# Patient Record
Sex: FEMALE | Race: WHITE | NOT HISPANIC OR LATINO
[De-identification: names, ages, dates, MRNs, and addresses within clinical notes are randomized per-mention and may not be internally consistent; named-entity substitution may affect disease eponyms.]

---

## 2017-08-15 ENCOUNTER — APPOINTMENT (OUTPATIENT)
Dept: COLORECTAL SURGERY | Facility: CLINIC | Age: 37
End: 2017-08-15
Payer: COMMERCIAL

## 2017-08-15 VITALS
RESPIRATION RATE: 12 BRPM | HEIGHT: 62 IN | HEART RATE: 80 BPM | BODY MASS INDEX: 27.6 KG/M2 | SYSTOLIC BLOOD PRESSURE: 126 MMHG | WEIGHT: 150 LBS | TEMPERATURE: 98.2 F | DIASTOLIC BLOOD PRESSURE: 80 MMHG

## 2017-08-15 PROCEDURE — 46940 TREATMENT OF ANAL FISSURE: CPT

## 2017-08-15 PROCEDURE — 99214 OFFICE O/P EST MOD 30 MIN: CPT | Mod: 25

## 2017-09-26 ENCOUNTER — APPOINTMENT (OUTPATIENT)
Dept: COLORECTAL SURGERY | Facility: CLINIC | Age: 37
End: 2017-09-26
Payer: COMMERCIAL

## 2017-09-26 VITALS
RESPIRATION RATE: 12 BRPM | TEMPERATURE: 98 F | DIASTOLIC BLOOD PRESSURE: 78 MMHG | HEIGHT: 62 IN | WEIGHT: 150 LBS | BODY MASS INDEX: 27.6 KG/M2 | HEART RATE: 78 BPM | SYSTOLIC BLOOD PRESSURE: 120 MMHG

## 2017-09-26 PROCEDURE — 99214 OFFICE O/P EST MOD 30 MIN: CPT

## 2017-09-26 RX ORDER — NITROGLYCERIN 4 MG/G
0.4 OINTMENT RECTAL
Qty: 1 | Refills: 1 | Status: ACTIVE | COMMUNITY
Start: 2017-08-15 | End: 1900-01-01

## 2018-01-31 ENCOUNTER — APPOINTMENT (OUTPATIENT)
Dept: COLORECTAL SURGERY | Facility: CLINIC | Age: 38
End: 2018-01-31
Payer: COMMERCIAL

## 2018-01-31 VITALS — HEIGHT: 62 IN | RESPIRATION RATE: 14 BRPM | WEIGHT: 150 LBS | BODY MASS INDEX: 27.6 KG/M2

## 2018-01-31 PROCEDURE — 46942 TREATMENT OF ANAL FISSURE: CPT

## 2018-01-31 PROCEDURE — 99214 OFFICE O/P EST MOD 30 MIN: CPT | Mod: 25

## 2018-04-17 ENCOUNTER — APPOINTMENT (OUTPATIENT)
Dept: COLORECTAL SURGERY | Facility: CLINIC | Age: 38
End: 2018-04-17
Payer: COMMERCIAL

## 2018-04-17 VITALS — WEIGHT: 135 LBS | HEIGHT: 62 IN | BODY MASS INDEX: 24.84 KG/M2 | RESPIRATION RATE: 14 BRPM

## 2018-04-17 PROCEDURE — 99214 OFFICE O/P EST MOD 30 MIN: CPT

## 2018-10-16 ENCOUNTER — APPOINTMENT (OUTPATIENT)
Dept: COLORECTAL SURGERY | Facility: CLINIC | Age: 38
End: 2018-10-16
Payer: COMMERCIAL

## 2018-10-16 VITALS
RESPIRATION RATE: 14 BRPM | HEIGHT: 62 IN | HEART RATE: 85 BPM | BODY MASS INDEX: 25.76 KG/M2 | DIASTOLIC BLOOD PRESSURE: 78 MMHG | WEIGHT: 140 LBS | SYSTOLIC BLOOD PRESSURE: 128 MMHG

## 2018-10-16 DIAGNOSIS — K64.8 OTHER HEMORRHOIDS: ICD-10-CM

## 2018-10-16 PROCEDURE — 46942 TREATMENT OF ANAL FISSURE: CPT

## 2018-10-16 PROCEDURE — 99214 OFFICE O/P EST MOD 30 MIN: CPT | Mod: 25

## 2018-12-14 ENCOUNTER — APPOINTMENT (OUTPATIENT)
Dept: COLORECTAL SURGERY | Facility: CLINIC | Age: 38
End: 2018-12-14

## 2019-04-10 ENCOUNTER — TRANSCRIPTION ENCOUNTER (OUTPATIENT)
Age: 39
End: 2019-04-10

## 2019-10-19 ENCOUNTER — TRANSCRIPTION ENCOUNTER (OUTPATIENT)
Age: 39
End: 2019-10-19

## 2021-07-06 ENCOUNTER — APPOINTMENT (OUTPATIENT)
Dept: COLORECTAL SURGERY | Facility: CLINIC | Age: 41
End: 2021-07-06
Payer: COMMERCIAL

## 2021-07-06 VITALS
DIASTOLIC BLOOD PRESSURE: 79 MMHG | HEIGHT: 62 IN | TEMPERATURE: 97.7 F | HEART RATE: 85 BPM | SYSTOLIC BLOOD PRESSURE: 123 MMHG | BODY MASS INDEX: 25.76 KG/M2 | WEIGHT: 140 LBS | RESPIRATION RATE: 14 BRPM

## 2021-07-06 DIAGNOSIS — K62.9 DISEASE OF ANUS AND RECTUM, UNSPECIFIED: ICD-10-CM

## 2021-07-06 DIAGNOSIS — K60.2 ANAL FISSURE, UNSPECIFIED: ICD-10-CM

## 2021-07-06 PROCEDURE — 46924 DESTRUCTION ANAL LESION(S): CPT

## 2021-07-06 PROCEDURE — 99072 ADDL SUPL MATRL&STAF TM PHE: CPT

## 2021-07-06 NOTE — ASSESSMENT
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

## 2021-07-06 NOTE — PHYSICAL EXAM
[Abdomen Masses] : No abdominal masses [Abdomen Tenderness] : ~T No ~M abdominal tenderness [Tender] : nontender [Normal rectal exam] : exam was normal [Excoriation] : no perianal excoriation [Tender, Swollen] : tender, swollen [Tight] : was tight [Posterior] : posteriorly [None] : there was no rectal mass  [JVD] : no jugular venous distention  [Normal Breath Sounds] : Normal breath sounds [Normal Heart Sounds] : normal heart sounds [Normal Rate and Rhythm] : normal rate and rhythm [No Rash or Lesion] : No rash or lesion [Alert] : alert [Oriented to Person] : oriented to person [Oriented to Place] : oriented to place [Oriented to Time] : oriented to time [Calm] : calm [de-identified] : Half a centimeter growth in the posterior anal region [de-identified] : Looks well in no distress, of stated age. [de-identified] : pupils equal reactive to light normocephalic atraumatic. [de-identified] : moves all 4 extremities appropriately with 5 over 5 strength

## 2021-07-06 NOTE — HISTORY OF PRESENT ILLNESS
[FreeTextEntry1] : 41 year-old female with chronic anal fissure. Occasional rectal bleeding, so has a new growth in the posterior perianal region which causes pain and bleeding

## 2021-07-06 NOTE — PROCEDURE
[FreeTextEntry1] : After informed consent was obtained removal of perianal lesion was excise sharply without any complications

## 2021-07-07 ENCOUNTER — LABORATORY RESULT (OUTPATIENT)
Age: 41
End: 2021-07-07

## 2021-07-20 ENCOUNTER — TRANSCRIPTION ENCOUNTER (OUTPATIENT)
Age: 41
End: 2021-07-20

## 2022-04-18 ENCOUNTER — TRANSCRIPTION ENCOUNTER (OUTPATIENT)
Age: 42
End: 2022-04-18

## 2022-12-15 ENCOUNTER — NON-APPOINTMENT (OUTPATIENT)
Age: 42
End: 2022-12-15

## 2022-12-22 ENCOUNTER — APPOINTMENT (OUTPATIENT)
Dept: ORTHOPEDIC SURGERY | Facility: CLINIC | Age: 42
End: 2022-12-22

## 2022-12-22 PROCEDURE — 20610 DRAIN/INJ JOINT/BURSA W/O US: CPT | Mod: RT

## 2022-12-22 PROCEDURE — 73502 X-RAY EXAM HIP UNI 2-3 VIEWS: CPT

## 2022-12-22 PROCEDURE — 99203 OFFICE O/P NEW LOW 30 MIN: CPT | Mod: 25

## 2022-12-22 PROCEDURE — J3490M: CUSTOM

## 2022-12-22 NOTE — ASSESSMENT
[FreeTextEntry1] : 42F with right hip troch bursitis\par \par Discussed conservative treatment of anti-inflammatories, CSI, and PT/HEP. May consider MRI in the future. She would like to proceed with troch bursa CSI today, tolerated well. Mobic and PT rx. f/up 6-8 weeks if symptoms fail to improve or worsen.

## 2022-12-22 NOTE — IMAGING
[Right] : right hip with pelvis [There are no fractures, subluxations or dislocations. No significant abnormalities are seen] : There are no fractures, subluxations or dislocations. No significant abnormalities are seen

## 2022-12-22 NOTE — DISCUSSION/SUMMARY
[de-identified] : The patient was advised of the diagnosis.  The natural history of the pathology was explained in full to the patient in layman's terms. All questions were answered.  The risks and benefits of surgical and non-surgical treatment alternatives were explained in full to the patient.\par \par The risks, benefits, contents and alternatives to injection were explained in full to the patient.  Risks outlined include but are not limited to infection, sepsis, bleeding, scarring, skin discoloration, temporary increase in pain, syncopal episode, failure to resolve symptoms, allergic reaction, flare reaction, permanent white skin discoloration, symptom recurrence, and elevation of blood sugar in diabetics.  Patient understood the risks.  All questions were answered.  After discussion of options, patient requested an injection.  Oral informed consent was obtained and sterile prep was done of the injection site.  Sterile technique was used to introduce the mixture.  Patient tolerated the procedure well.  Patient advised to ice the injection site this evening.  Signs and symptoms of infection reviewed and patient advised to call immediately for redness, fevers, and/or chills. \par

## 2022-12-22 NOTE — HISTORY OF PRESENT ILLNESS
[de-identified] : 41yo F with lateral right hip pain that has been worsening over the past few days with no injury. She states she has had mild intermittent pain of this area over the past 2 years and has been treated with acupuncture in the past. Painful with laying on her side.

## 2023-01-09 ENCOUNTER — APPOINTMENT (OUTPATIENT)
Dept: ORTHOPEDIC SURGERY | Facility: CLINIC | Age: 43
End: 2023-01-09
Payer: COMMERCIAL

## 2023-01-09 VITALS — HEIGHT: 62 IN | WEIGHT: 140 LBS | BODY MASS INDEX: 25.76 KG/M2

## 2023-01-09 PROCEDURE — 99214 OFFICE O/P EST MOD 30 MIN: CPT

## 2023-01-11 ENCOUNTER — RESULT REVIEW (OUTPATIENT)
Age: 43
End: 2023-01-11

## 2023-01-14 NOTE — DISCUSSION/SUMMARY
[de-identified] : Assessment: The patient is a 42 year old female with right lateral hip pain and physical exam findings consistent with trochanteric bursitis versus gluteal tendon tear.\par \par Patient and I discussed their symptoms. Discussed findings of today's exam and possible causes of patient's pain. Educated patient on their most probable diagnosis. Reviewed possible courses of treatment, and we collaboratively decided best course of treatment at this time will include:\par \par 1. MRI right hip to eval for gluteal tendon tear\par \par \par The patient's current medication management of their orthopedic diagnosis was reviewed today: \par \par (1) We discussed a comprehensive treatment plan that included possible pharmaceutical management involving the use of prescription strength medications including but not limited to options such as oral Naprosyn 500mg BID, once daily Meloxicam 15 mg, or 500-650 mg Tylenol versus over the counter oral medications and topical prescription NSAID Pennsaid vs over the counter Voltaren gel. \par \par (2) There is a moderate risk of morbidity with further treatment, especially from use of prescription strength medications and possible side effects of these medications which include upset stomach with oral medications, skin reactions to topical medications and cardiac/renal issues with long term use. \par \par (3) I recommended that the patient follow-up with their medical physician to discuss any significant specific potential issues with long term medication use such as interactions with current medications or with exacerbation of underlying medical comorbidities. \par \par (4) The benefits and risks associated with use of injectable, oral or topical, prescription and over the counter anti-inflammatory medications were discussed with the patient. The patient voiced understanding of the risks including but not limited to bleeding, stroke, kidney dysfunction, heart disease, and were referred to the black box warning label for further information.\par \par Follow up after MRI.

## 2023-01-14 NOTE — HISTORY OF PRESENT ILLNESS
[de-identified] : The patient is a 42 year old right hand dominant female who presents today complaining of right hip .\par \par Date of Injury/Onset:  06/2020 \par \par Pain: At Rest: 7/10\par \par With Activity: 8/10\par \par Mechanism of injury: pt states no injury has occurred. \par \par This is not a Work Related Injury being treated under Worker's Compensation.\par \par This is not an athletic injury occurring associated with an interscholastic or organized sports team.\par \par Quality of symptoms: sharp, tightness \par \par Improves with: N/a \par \par Worse with: overuse, activity, movement. \par \par Prior treatment: csi, anti inflammatory \par \par Prior Imaging: x-ray \par \par Additional Information: None

## 2023-01-14 NOTE — PHYSICAL EXAM
[de-identified] : The patient is a well appearing 42 year.\par \par Patient ambulates with an nonantalgic gait. \par \par Pelvis: \par \par Symphysis pubis: Stable, no tenderness to palpation \par Palpable Hernias/Masses: None \par Resisted Sit Up: Negative \par \par Right Hip/Groin/Thigh: \par ROM: \par     Flexion: 0-120 degrees \par     Extension: 0-10 degrees \par     ABduction: 0-40 degrees \par     Adduction: 0-40 degrees \par     External Rotation: 0-40 degrees \par     Internal Rotation: 0-35 degrees \par PROVOCATIVE TESTING: \par      PAUL TST: Positive \par      JAQUI'S TST: Negative \par      PIRIFORMIS TST: Negative \par      Straight Leg Raise:  Negative \par      Seated Straight Leg Raise: Negative \par      IMPINGEMENT TST: Positive \par      Resisted ADduction : Negative \par \par PALPATION: \par         ASIS: Nontender \par         AIIS: Nontender \par         Greater Trochanter/IT-Band: Nontender \par         Illiac Crest: Nontender \par         Ischial Tuberosity: TTP \par         Hip Flexor: Nontender \par         Quadriceps: Nontender \par         Proximal Hamstring Origin: TTP \par         Proximal Hamstring Muscle-Tendon Junction: TTP \par         Hamstring Muscle Belly: Nontender \par         ADductor :  Nontender  \par         Lower Rectus Abdominis:  Nontender \par INSPECTION: \par         Deformity: No  \par         Erythema: No \par         Ecchymosis: No \par         Abrasions: No \par         Effusion: No \par         Groin mass/bulge: No \par        Palpable Hernia: No \par NEUROLOGIC EXAM: \par         Sensation L2-S1: Grossly Intact \par MOTOR EXAM: \par         Quadriceps: 5 out of 5 \par         Hamstrings: 5 out of 5 \par         ABduction: 5 out of 5 \par         ADduction: 5 out of 5 \par         Hip Flexion: 5 out of 5 \par         TA: 5 out of 5 \par         GS: 5 out of 5 \par Circulatory/Pulses: \par         Dorsalis Pedis:      2+ \par         Posterior Tibialis: 2+ \par  \par Left Hip/Groin/Thigh: \par ROM: \par     Flexion: 0-120 degrees \par     Extension: 0-10 degrees \par     ABduction: 0-40 degrees \par     Adduction: 0-40 degrees \par     External Rotation: 0-40 degrees \par     Internal Rotation: 0-35 degrees \par PROVOCATIVE TESTING: \par      PAUL TST: Negative \par      JAQUI'S TST: Negative \par      PIRIFORMIS TST: Negative \par      Straight Leg Raise:  Negative \par      Seated Straight Leg Raise: Negative \par      IMPINGEMENT TST: Negative \par      Resisted ADduction : Negative \par PALPATION: \par         ASIS: Nontender \par         AIIS: Nontender \par         Greater Trochanter/IT-Band: TTP\par         Illiac Crest: Nontender \par         Ischial Tuberosity: Nontender \par         Hip Flexor: Nontender \par         Quadriceps: Nontender \par         Proximal Hamstring Origin: Nontender \par         Proximal Hamstring Muscle-Tendon Junction: Nontender \par         Hamstring Muscle Belly: Nontender \par         ADductor :  Nontender  \par         Lower Rectus Abdominis:  Nontender \par INSPECTION: \par         Deformity: No  \par         Erythema: No \par         Ecchymosis: No \par         Abrasions: No \par         Effusion: No \par         Groin mass/bulge: No \par        Palpable Hernia: No \par NEUROLOGIC EXAM: \par         Sensation L2-S1: Grossly Intact \par MOTOR EXAM: \par         Quadriceps: 5 out of 5 \par         Hamstrings: 5 out of 5 \par         ABduction: 5 out of 5 \par         ADduction: 5 out of 5 \par         Hip Flexion: 5 out of 5 \par         TA: 5 out of 5 \par         GS: 5 out of 5 \par Circulatory/Pulses: \par         Dorsalis Pedis:      2+ \par         Posterior Tibialis: 2+  [There are no fractures, subluxations or dislocations. No significant abnormalities are seen] : There are no fractures, subluxations or dislocations. No significant abnormalities are seen

## 2023-01-20 ENCOUNTER — RX RENEWAL (OUTPATIENT)
Age: 43
End: 2023-01-20

## 2023-01-20 RX ORDER — MELOXICAM 15 MG/1
15 TABLET ORAL
Qty: 30 | Refills: 0 | Status: ACTIVE | COMMUNITY
Start: 2022-12-22 | End: 1900-01-01

## 2023-01-23 ENCOUNTER — APPOINTMENT (OUTPATIENT)
Dept: ORTHOPEDIC SURGERY | Facility: CLINIC | Age: 43
End: 2023-01-23
Payer: COMMERCIAL

## 2023-01-23 VITALS — HEIGHT: 62 IN | BODY MASS INDEX: 25.76 KG/M2 | WEIGHT: 140 LBS

## 2023-01-23 PROCEDURE — 99214 OFFICE O/P EST MOD 30 MIN: CPT

## 2023-01-23 RX ORDER — DICLOFENAC SODIUM 75 MG/1
75 TABLET, DELAYED RELEASE ORAL TWICE DAILY
Qty: 60 | Refills: 0 | Status: ACTIVE | COMMUNITY
Start: 2023-01-23 | End: 1900-01-01

## 2023-01-30 NOTE — DISCUSSION/SUMMARY
[de-identified] : Assessment: The patient is a 42 year old female with right lateral hip pain and physical exam findings consistent with trochanteric bursitis and luteal tendinopathy.\par \par Patient and I discussed their symptoms. Discussed findings of today's exam and possible causes of patient's pain. Educated patient on their most probable diagnosis. Reviewed possible courses of treatment, and we collaboratively decided best course of treatment at this time will include:\par \par 1. PT\par 2. NSAIDs\par \par \par The patient's current medication management of their orthopedic diagnosis was reviewed today: \par \par (1) We discussed a comprehensive treatment plan that included possible pharmaceutical management involving the use of prescription strength medications including but not limited to options such as oral Naprosyn 500mg BID, once daily Meloxicam 15 mg, or 500-650 mg Tylenol versus over the counter oral medications and topical prescription NSAID Pennsaid vs over the counter Voltaren gel. \par \par (2) There is a moderate risk of morbidity with further treatment, especially from use of prescription strength medications and possible side effects of these medications which include upset stomach with oral medications, skin reactions to topical medications and cardiac/renal issues with long term use. \par \par (3) I recommended that the patient follow-up with their medical physician to discuss any significant specific potential issues with long term medication use such as interactions with current medications or with exacerbation of underlying medical comorbidities. \par \par (4) The benefits and risks associated with use of injectable, oral or topical, prescription and over the counter anti-inflammatory medications were discussed with the patient. The patient voiced understanding of the risks including but not limited to bleeding, stroke, kidney dysfunction, heart disease, and were referred to the black box warning label for further information.\par \par Follow up 4-6 weeks.

## 2023-01-30 NOTE — DATA REVIEWED
[MRI] : MRI [Right] : of the right [Hip] : hip [Report was reviewed and noted in the chart] : The report was reviewed and noted in the chart [I independently reviewed and interpreted images and report] : I independently reviewed and interpreted images and report [FreeTextEntry1] : Small linear nondisplaced tear at the anterior superior labral base and slight\par thinning of femoral acetabular cartilage.\par \par Mild right greater trochanteric bursitis.\par \par Degenerative changes in the lumbar spine, not fully evaluated. There is a\par transitional vertebrae at the lumbosacral junction

## 2023-01-30 NOTE — PHYSICAL EXAM
[de-identified] : The patient is a well appearing 42 year.\par \par Patient ambulates with an nonantalgic gait. \par \par Pelvis: \par \par Symphysis pubis: Stable, no tenderness to palpation \par Palpable Hernias/Masses: None \par Resisted Sit Up: Negative \par \par Right Hip/Groin/Thigh: \par ROM: \par     Flexion: 0-120 degrees \par     Extension: 0-10 degrees \par     ABduction: 0-40 degrees \par     Adduction: 0-40 degrees \par     External Rotation: 0-40 degrees \par     Internal Rotation: 0-35 degrees \par PROVOCATIVE TESTING: \par      PAUL TST: Positive \par      JAQUI'S TST: Negative \par      PIRIFORMIS TST: Negative \par      Straight Leg Raise:  Negative \par      Seated Straight Leg Raise: Negative \par      IMPINGEMENT TST: Positive \par      Resisted ADduction : Negative \par \par PALPATION: \par         ASIS: Nontender \par         AIIS: Nontender \par         Greater Trochanter/IT-Band: Nontender \par         Illiac Crest: Nontender \par         Ischial Tuberosity: TTP \par         Hip Flexor: Nontender \par         Quadriceps: Nontender \par         Proximal Hamstring Origin: TTP \par         Proximal Hamstring Muscle-Tendon Junction: TTP \par         Hamstring Muscle Belly: Nontender \par         ADductor :  Nontender  \par         Lower Rectus Abdominis:  Nontender \par INSPECTION: \par         Deformity: No  \par         Erythema: No \par         Ecchymosis: No \par         Abrasions: No \par         Effusion: No \par         Groin mass/bulge: No \par        Palpable Hernia: No \par NEUROLOGIC EXAM: \par         Sensation L2-S1: Grossly Intact \par MOTOR EXAM: \par         Quadriceps: 5 out of 5 \par         Hamstrings: 5 out of 5 \par         ABduction: 5 out of 5 \par         ADduction: 5 out of 5 \par         Hip Flexion: 5 out of 5 \par         TA: 5 out of 5 \par         GS: 5 out of 5 \par Circulatory/Pulses: \par         Dorsalis Pedis:      2+ \par         Posterior Tibialis: 2+ \par  \par Left Hip/Groin/Thigh: \par ROM: \par     Flexion: 0-120 degrees \par     Extension: 0-10 degrees \par     ABduction: 0-40 degrees \par     Adduction: 0-40 degrees \par     External Rotation: 0-40 degrees \par     Internal Rotation: 0-35 degrees \par PROVOCATIVE TESTING: \par      PAUL TST: Negative \par      JAQUI'S TST: Negative \par      PIRIFORMIS TST: Negative \par      Straight Leg Raise:  Negative \par      Seated Straight Leg Raise: Negative \par      IMPINGEMENT TST: Negative \par      Resisted ADduction : Negative \par PALPATION: \par         ASIS: Nontender \par         AIIS: Nontender \par         Greater Trochanter/IT-Band: TTP\par         Illiac Crest: Nontender \par         Ischial Tuberosity: Nontender \par         Hip Flexor: Nontender \par         Quadriceps: Nontender \par         Proximal Hamstring Origin: Nontender \par         Proximal Hamstring Muscle-Tendon Junction: Nontender \par         Hamstring Muscle Belly: Nontender \par         ADductor :  Nontender  \par         Lower Rectus Abdominis:  Nontender \par INSPECTION: \par         Deformity: No  \par         Erythema: No \par         Ecchymosis: No \par         Abrasions: No \par         Effusion: No \par         Groin mass/bulge: No \par        Palpable Hernia: No \par NEUROLOGIC EXAM: \par         Sensation L2-S1: Grossly Intact \par MOTOR EXAM: \par         Quadriceps: 5 out of 5 \par         Hamstrings: 5 out of 5 \par         ABduction: 5 out of 5 \par         ADduction: 5 out of 5 \par         Hip Flexion: 5 out of 5 \par         TA: 5 out of 5 \par         GS: 5 out of 5 \par Circulatory/Pulses: \par         Dorsalis Pedis:      2+ \par         Posterior Tibialis: 2+

## 2023-01-30 NOTE — HISTORY OF PRESENT ILLNESS
[de-identified] : 01/23/2023 \par \haile STYLES is presenting today for followup. Pain and symptoms are similar to the previous visit. She denies any numbness/tingling/fevers/chills. She underwent an MRI since last visit, and is here to discuss the imaging results.

## 2023-02-16 ENCOUNTER — APPOINTMENT (OUTPATIENT)
Dept: ORTHOPEDIC SURGERY | Facility: CLINIC | Age: 43
End: 2023-02-16

## 2023-03-20 ENCOUNTER — APPOINTMENT (OUTPATIENT)
Dept: ORTHOPEDIC SURGERY | Facility: CLINIC | Age: 43
End: 2023-03-20
Payer: COMMERCIAL

## 2023-03-20 DIAGNOSIS — M76.01 GLUTEAL TENDINITIS, RIGHT HIP: ICD-10-CM

## 2023-03-20 DIAGNOSIS — M70.61 TROCHANTERIC BURSITIS, RIGHT HIP: ICD-10-CM

## 2023-03-20 PROCEDURE — 72050 X-RAY EXAM NECK SPINE 4/5VWS: CPT

## 2023-03-20 PROCEDURE — 99214 OFFICE O/P EST MOD 30 MIN: CPT

## 2023-03-20 PROCEDURE — 73030 X-RAY EXAM OF SHOULDER: CPT | Mod: RT

## 2023-03-20 RX ORDER — CYCLOBENZAPRINE HYDROCHLORIDE 5 MG/1
5 TABLET, FILM COATED ORAL
Qty: 30 | Refills: 0 | Status: COMPLETED | COMMUNITY
Start: 2023-03-20 | End: 2023-04-19

## 2023-03-20 RX ORDER — METHYLPREDNISOLONE 4 MG/1
4 TABLET ORAL
Qty: 1 | Refills: 0 | Status: ACTIVE | COMMUNITY
Start: 2023-03-20 | End: 1900-01-01

## 2023-03-29 NOTE — DISCUSSION/SUMMARY
[de-identified] : Assessment: The patient is a 42 year old female with right hip/glut pain that is resolving, and cervical radiculopathy.\par \par Patient and I discussed their symptoms. Discussed findings of today's exam and possible causes of patient's pain. Educated patient on their most probable diagnosis. Reviewed possible courses of treatment, and we collaboratively decided best course of treatment at this time will include:\par \par 1. MDP and cyclobenzaprine\par 2. PT\par 3. Activity modification\par \par The patient's current medication management of their orthopedic diagnosis was reviewed today: \par \par (1) We discussed a comprehensive treatment plan that included possible pharmaceutical management involving the use of prescription strength medications including but not limited to options such as oral Naprosyn 500mg BID, once daily Meloxicam 15 mg, or 500-650 mg Tylenol versus over the counter oral medications and topical prescription NSAID Pennsaid vs over the counter Voltaren gel. \par \par (2) There is a moderate risk of morbidity with further treatment, especially from use of prescription strength medications and possible side effects of these medications which include upset stomach with oral medications, skin reactions to topical medications and cardiac/renal issues with long term use. \par \par (3) I recommended that the patient follow-up with their medical physician to discuss any significant specific potential issues with long term medication use such as interactions with current medications or with exacerbation of underlying medical comorbidities. \par \par (4) The benefits and risks associated with use of injectable, oral or topical, prescription and over the counter anti-inflammatory medications were discussed with the patient. The patient voiced understanding of the risks including but not limited to bleeding, stroke, kidney dysfunction, heart disease, and were referred to the black box warning label for further information.\par \par Prior to appointment and during encounter with patient extensive medical records were reviewed including but not limited to, hospital records, out patient records, imaging results, and lab data. During this appointment the patient was examined, diagnoses were discussed and explained in a face to face manner. In addition extensive time was spent reviewing aforementioned diagnostic studies. Counseling including abnormal image results, differential diagnoses, treatment options, risk and benefits, lifestyle changes, current condition, and current medications was performed. Patient's comments, questions, and concerns were address and patient verbalized understanding.\par \par Follow up in 6-8 weeks.

## 2023-03-29 NOTE — PHYSICAL EXAM
[de-identified] : The patient is a well appearing 42 year.\par \par Patient ambulates with an nonantalgic gait. \par \par Pelvis: \par \par Symphysis pubis: Stable, no tenderness to palpation \par Palpable Hernias/Masses: None \par Resisted Sit Up: Negative \par \par Right Hip/Groin/Thigh: \par ROM: \par     Flexion: 0-120 degrees \par     Extension: 0-10 degrees \par     ABduction: 0-40 degrees \par     Adduction: 0-40 degrees \par     External Rotation: 0-40 degrees \par     Internal Rotation: 0-35 degrees \par PROVOCATIVE TESTING: \par      PAUL TST: Positive \par      JAQUI'S TST: Negative \par      PIRIFORMIS TST: Negative \par      Straight Leg Raise:  Negative \par      Seated Straight Leg Raise: Negative \par      IMPINGEMENT TST: Positive \par      Resisted ADduction : Negative \par \par PALPATION: \par         ASIS: Nontender \par         AIIS: Nontender \par         Greater Trochanter/IT-Band: Nontender \par         Illiac Crest: Nontender \par         Ischial Tuberosity: TTP \par         Hip Flexor: Nontender \par         Quadriceps: Nontender \par         Proximal Hamstring Origin: TTP \par         Proximal Hamstring Muscle-Tendon Junction: TTP \par         Hamstring Muscle Belly: Nontender \par         ADductor :  Nontender  \par         Lower Rectus Abdominis:  Nontender \par INSPECTION: \par         Deformity: No  \par         Erythema: No \par         Ecchymosis: No \par         Abrasions: No \par         Effusion: No \par         Groin mass/bulge: No \par        Palpable Hernia: No \par NEUROLOGIC EXAM: \par         Sensation L2-S1: Grossly Intact \par MOTOR EXAM: \par         Quadriceps: 5 out of 5 \par         Hamstrings: 5 out of 5 \par         ABduction: 5 out of 5 \par         ADduction: 5 out of 5 \par         Hip Flexion: 5 out of 5 \par         TA: 5 out of 5 \par         GS: 5 out of 5 \par Circulatory/Pulses: \par         Dorsalis Pedis:      2+ \par         Posterior Tibialis: 2+ \par  \par Left Hip/Groin/Thigh: \par ROM: \par     Flexion: 0-120 degrees \par     Extension: 0-10 degrees \par     ABduction: 0-40 degrees \par     Adduction: 0-40 degrees \par     External Rotation: 0-40 degrees \par     Internal Rotation: 0-35 degrees \par PROVOCATIVE TESTING: \par      PAUL TST: Negative \par      JAQUI'S TST: Negative \par      PIRIFORMIS TST: Negative \par      Straight Leg Raise:  Negative \par      Seated Straight Leg Raise: Negative \par      IMPINGEMENT TST: Negative \par      Resisted ADduction : Negative \par PALPATION: \par         ASIS: Nontender \par         AIIS: Nontender \par         Greater Trochanter/IT-Band: TTP\par         Illiac Crest: Nontender \par         Ischial Tuberosity: Nontender \par         Hip Flexor: Nontender \par         Quadriceps: Nontender \par         Proximal Hamstring Origin: Nontender \par         Proximal Hamstring Muscle-Tendon Junction: Nontender \par         Hamstring Muscle Belly: Nontender \par         ADductor :  Nontender  \par         Lower Rectus Abdominis:  Nontender \par INSPECTION: \par         Deformity: No  \par         Erythema: No \par         Ecchymosis: No \par         Abrasions: No \par         Effusion: No \par         Groin mass/bulge: No \par        Palpable Hernia: No \par NEUROLOGIC EXAM: \par         Sensation L2-S1: Grossly Intact \par MOTOR EXAM: \par         Quadriceps: 5 out of 5 \par         Hamstrings: 5 out of 5 \par         ABduction: 5 out of 5 \par         ADduction: 5 out of 5 \par         Hip Flexion: 5 out of 5 \par         TA: 5 out of 5 \par         GS: 5 out of 5 \par Circulatory/Pulses: \par         Dorsalis Pedis:      2+ \par         Posterior Tibialis: 2+  [] : positive Spurling [No bony abnormalities] : No bony abnormalities [Straightening consistent with spasm] : Straightening consistent with spasm

## 2023-05-01 ENCOUNTER — APPOINTMENT (OUTPATIENT)
Dept: ORTHOPEDIC SURGERY | Facility: CLINIC | Age: 43
End: 2023-05-01

## 2023-05-01 DIAGNOSIS — M54.12 RADICULOPATHY, CERVICAL REGION: ICD-10-CM

## 2023-05-24 ENCOUNTER — RESULT REVIEW (OUTPATIENT)
Age: 43
End: 2023-05-24

## 2023-07-24 ENCOUNTER — APPOINTMENT (OUTPATIENT)
Dept: ORTHOPEDIC SURGERY | Facility: CLINIC | Age: 43
End: 2023-07-24
Payer: COMMERCIAL

## 2023-07-24 DIAGNOSIS — M75.81 OTHER SHOULDER LESIONS, RIGHT SHOULDER: ICD-10-CM

## 2023-07-24 DIAGNOSIS — M54.2 CERVICALGIA: ICD-10-CM

## 2023-07-24 DIAGNOSIS — M77.11 LATERAL EPICONDYLITIS, RIGHT ELBOW: ICD-10-CM

## 2023-07-24 PROCEDURE — 99214 OFFICE O/P EST MOD 30 MIN: CPT

## 2023-07-24 PROCEDURE — L3908: CPT

## 2023-07-24 RX ORDER — DICLOFENAC SODIUM 75 MG/1
75 TABLET, DELAYED RELEASE ORAL
Qty: 42 | Refills: 1 | Status: ACTIVE | COMMUNITY
Start: 2023-07-24 | End: 1900-01-01

## 2023-07-24 NOTE — DISCUSSION/SUMMARY
[de-identified] : Assessment: The patient is a 42 year old female with right hip/glut pain that is resolving, and cervical radiculopathy.\par \par Patient and I discussed their symptoms. Discussed findings of today's exam and possible causes of patient's pain. Educated patient on their most probable diagnosis. Reviewed possible courses of treatment, and we collaboratively decided best course of treatment at this time will include:\par \par 1.\par 2.\par 3.\par \par The patient's current medication management of their orthopedic diagnosis was reviewed today: \par \par (1) We discussed a comprehensive treatment plan that included possible pharmaceutical management involving the use of prescription strength medications including but not limited to options such as oral Naprosyn 500mg BID, once daily Meloxicam 15 mg, or 500-650 mg Tylenol versus over the counter oral medications and topical prescription NSAID Pennsaid vs over the counter Voltaren gel. \par \par (2) There is a moderate risk of morbidity with further treatment, especially from use of prescription strength medications and possible side effects of these medications which include upset stomach with oral medications, skin reactions to topical medications and cardiac/renal issues with long term use. \par \par (3) I recommended that the patient follow-up with their medical physician to discuss any significant specific potential issues with long term medication use such as interactions with current medications or with exacerbation of underlying medical comorbidities. \par \par (4) The benefits and risks associated with use of injectable, oral or topical, prescription and over the counter anti-inflammatory medications were discussed with the patient. The patient voiced understanding of the risks including but not limited to bleeding, stroke, kidney dysfunction, heart disease, and were referred to the black box warning label for further information.\par \par Prior to appointment and during encounter with patient extensive medical records were reviewed including but not limited to, hospital records, out patient records, imaging results, and lab data. During this appointment the patient was examined, diagnoses were discussed and explained in a face to face manner. In addition extensive time was spent reviewing aforementioned diagnostic studies. Counseling including abnormal image results, differential diagnoses, treatment options, risk and benefits, lifestyle changes, current condition, and current medications was performed. Patient's comments, questions, and concerns were address and patient verbalized understanding.\par \par Follow up in 6-8 weeks.

## 2023-07-24 NOTE — HISTORY OF PRESENT ILLNESS
[de-identified] : 07/24/23: STEPHANI BEJARANO is a follow up for MRI Right shoulder and neck, pain persists right side of neck, radiates to shoulder.  She is able to rotate her neck better.  Still feels a pulling sensation right side of neck.  Positional pain qhs, she has to sleep lying flat with one pillow flat. Now also c/o right elbow pain since doing yard work Memorial Day weekend. Pain gripping, grasping, reaching to take something out of cabinet. Use hole  or force in hand.\par Improves With: limiting aggravating activity\par Worse With: rotating head, gripping, grasping, lying onto her right shoulder\par Quality of Symptoms: pulling tight right side of neck, sharp when she lies on her shoulder\par Treatment: no meds or tx\par Additional Information: no neck/shoulder injections, no PT\par

## 2023-07-24 NOTE — ASSESSMENT
[FreeTextEntry1] : Assessment: The patient is a 43 year old female with neck, right shoulder and right elbow pain and physical exam findings consistent with Cervicalgia, RC tendinitis, R Lateral epicondylitis.\par \par Patient and I discussed their symptoms. Discussed findings of today's exam and possible causes of patient's pain. Educated patient on their most probable diagnosis. Reviewed possible courses of treatment, and we collaboratively decided best course of treatment at this time will include:\par \par 1. MRI Cervical spine and R shoulder reviewed\par 2. R wrist splint qhs to neutralize the wrist\par 3. PT for neck, shoulder and elbow\par 4) counterforce brace for gardening and activity\par 5) R shoulder CSI offered \par 6) if pain persists, recommend consult with rheumatologist\par \par The patient's current medication management of their orthopedic diagnosis was reviewed today: \par \par (1) We discussed a comprehensive treatment plan that included possible pharmaceutical management involving the use of prescription strength medications including but not limited to options such as oral Naprosyn 500mg BID, once daily Meloxicam 15 mg, or 500-650 mg Tylenol versus over the counter oral medications and topical prescription NSAID Pennsaid vs over the counter Voltaren gel. \par \par (2) There is a moderate risk of morbidity with further treatment, especially from use of prescription strength medications and possible side effects of these medications which include upset stomach with oral medications, skin reactions to topical medications and cardiac/renal issues with long term use. \par \par (3) I recommended that the patient follow-up with their medical physician to discuss any significant specific potential issues with long term medication use such as interactions with current medications or with exacerbation of underlying medical comorbidities. \par \par (4) The benefits and risks associated with use of injectable, oral or topical, prescription and over the counter anti-inflammatory medications were discussed with the patient. The patient voiced understanding of the risks including but not limited to bleeding, stroke, kidney dysfunction, heart disease, and were referred to the black box warning label for further information.\par \par Prior to appointment and during encounter with patient extensive medical records were reviewed including but not limited to, hospital records, out patient records, imaging results, and lab data. During this appointment the patient was examined, diagnoses were discussed and explained in a face to face manner. In addition extensive time was spent reviewing aforementioned diagnostic studies. Counseling including abnormal image results, differential diagnoses, treatment options, risk and benefits, lifestyle changes, current condition, and current medications was performed. Patient's comments, questions, and concerns were address and patient verbalized understanding.\par \par Follow up in 3 months\par

## 2023-07-24 NOTE — PHYSICAL EXAM
[Rotation to left] : rotation to left [Rotation to right] : rotation to right [Right] : right elbow [Pain with Pronation] : pain with pronation [Pain with Supination] : pain with supination [de-identified] : The patient is a well appearing 42 year.\par \par Patient ambulates with an nonantalgic gait. \par \par Pelvis: \par \par Symphysis pubis: Stable, no tenderness to palpation \par Palpable Hernias/Masses: None \par Resisted Sit Up: Negative \par \par Right Hip/Groin/Thigh: \par ROM: \par     Flexion: 0-120 degrees \par     Extension: 0-10 degrees \par     ABduction: 0-40 degrees \par     Adduction: 0-40 degrees \par     External Rotation: 0-40 degrees \par     Internal Rotation: 0-35 degrees \par PROVOCATIVE TESTING: \par      PAUL TST: Positive \par      JAQUI'S TST: Negative \par      PIRIFORMIS TST: Negative \par      Straight Leg Raise:  Negative \par      Seated Straight Leg Raise: Negative \par      IMPINGEMENT TST: Positive \par      Resisted ADduction : Negative \par \par PALPATION: \par         ASIS: Nontender \par         AIIS: Nontender \par         Greater Trochanter/IT-Band: Nontender \par         Illiac Crest: Nontender \par         Ischial Tuberosity: TTP \par         Hip Flexor: Nontender \par         Quadriceps: Nontender \par         Proximal Hamstring Origin: TTP \par         Proximal Hamstring Muscle-Tendon Junction: TTP \par         Hamstring Muscle Belly: Nontender \par         ADductor :  Nontender  \par         Lower Rectus Abdominis:  Nontender \par INSPECTION: \par         Deformity: No  \par         Erythema: No \par         Ecchymosis: No \par         Abrasions: No \par         Effusion: No \par         Groin mass/bulge: No \par        Palpable Hernia: No \par NEUROLOGIC EXAM: \par         Sensation L2-S1: Grossly Intact \par MOTOR EXAM: \par         Quadriceps: 5 out of 5 \par         Hamstrings: 5 out of 5 \par         ABduction: 5 out of 5 \par         ADduction: 5 out of 5 \par         Hip Flexion: 5 out of 5 \par         TA: 5 out of 5 \par         GS: 5 out of 5 \par Circulatory/Pulses: \par         Dorsalis Pedis:      2+ \par         Posterior Tibialis: 2+ \par  \par Left Hip/Groin/Thigh: \par ROM: \par     Flexion: 0-120 degrees \par     Extension: 0-10 degrees \par     ABduction: 0-40 degrees \par     Adduction: 0-40 degrees \par     External Rotation: 0-40 degrees \par     Internal Rotation: 0-35 degrees \par PROVOCATIVE TESTING: \par      PAUL TST: Negative \par      JAQUI'S TST: Negative \par      PIRIFORMIS TST: Negative \par      Straight Leg Raise:  Negative \par      Seated Straight Leg Raise: Negative \par      IMPINGEMENT TST: Negative \par      Resisted ADduction : Negative \par PALPATION: \par         ASIS: Nontender \par         AIIS: Nontender \par         Greater Trochanter/IT-Band: TTP\par         Illiac Crest: Nontender \par         Ischial Tuberosity: Nontender \par         Hip Flexor: Nontender \par         Quadriceps: Nontender \par         Proximal Hamstring Origin: Nontender \par         Proximal Hamstring Muscle-Tendon Junction: Nontender \par         Hamstring Muscle Belly: Nontender \par         ADductor :  Nontender  \par         Lower Rectus Abdominis:  Nontender \par INSPECTION: \par         Deformity: No  \par         Erythema: No \par         Ecchymosis: No \par         Abrasions: No \par         Effusion: No \par         Groin mass/bulge: No \par        Palpable Hernia: No \par NEUROLOGIC EXAM: \par         Sensation L2-S1: Grossly Intact \par MOTOR EXAM: \par         Quadriceps: 5 out of 5 \par         Hamstrings: 5 out of 5 \par         ABduction: 5 out of 5 \par         ADduction: 5 out of 5 \par         Hip Flexion: 5 out of 5 \par         TA: 5 out of 5 \par         GS: 5 out of 5 \par Circulatory/Pulses: \par         Dorsalis Pedis:      2+ \par         Posterior Tibialis: 2+  [] : no erythema

## 2023-07-24 NOTE — DATA REVIEWED
[MRI] : MRI [Right] : of the right [Shoulder] : shoulder [Cervical Spine] : cervical spine [Report was reviewed and noted in the chart] : The report was reviewed and noted in the chart [I reviewed the films/CD] : I reviewed the films/CD [FreeTextEntry1] : [facility] MRI RIGHT SHOULDER IMPRESSIONS: 05/24/23\par 1. Mild tendinopathy of the supraspinatus and infraspinatus tendons\par 2. Mild AC joint arthrosis.\par 3. No evidence of labral tear.\par [facility] MRI CERVICAL SPINE IMPRESSIONS: 05/24/23\par 1. No acute abnormality. Spinal cord normal. No canal stenosis. Neuroforaminal\par narrowing as discussed above.\par \par

## 2023-10-23 ENCOUNTER — APPOINTMENT (OUTPATIENT)
Dept: ORTHOPEDIC SURGERY | Facility: CLINIC | Age: 43
End: 2023-10-23

## 2024-03-25 ENCOUNTER — NON-APPOINTMENT (OUTPATIENT)
Age: 44
End: 2024-03-25

## 2024-07-09 ENCOUNTER — APPOINTMENT (OUTPATIENT)
Dept: ORTHOPEDIC SURGERY | Facility: CLINIC | Age: 44
End: 2024-07-09

## 2024-07-09 VITALS — HEIGHT: 62 IN | BODY MASS INDEX: 25.76 KG/M2 | WEIGHT: 140 LBS

## 2024-07-09 DIAGNOSIS — M54.16 RADICULOPATHY, LUMBAR REGION: ICD-10-CM

## 2024-07-09 DIAGNOSIS — M43.16 SPONDYLOLISTHESIS, LUMBAR REGION: ICD-10-CM

## 2024-07-09 PROCEDURE — 72100 X-RAY EXAM L-S SPINE 2/3 VWS: CPT

## 2024-07-09 RX ORDER — METHYLPREDNISOLONE 4 MG/1
4 TABLET ORAL
Qty: 1 | Refills: 0 | Status: ACTIVE | COMMUNITY
Start: 2024-07-09 | End: 1900-01-01

## 2024-07-09 RX ORDER — MELOXICAM 15 MG/1
15 TABLET ORAL
Qty: 30 | Refills: 1 | Status: ACTIVE | COMMUNITY
Start: 2024-07-09 | End: 1900-01-01

## 2024-07-20 ENCOUNTER — RESULT REVIEW (OUTPATIENT)
Age: 44
End: 2024-07-20

## 2024-07-29 ENCOUNTER — APPOINTMENT (OUTPATIENT)
Dept: ORTHOPEDIC SURGERY | Facility: CLINIC | Age: 44
End: 2024-07-29

## 2024-07-29 VITALS — WEIGHT: 140 LBS | BODY MASS INDEX: 25.76 KG/M2 | HEIGHT: 62 IN

## 2024-07-29 DIAGNOSIS — M43.16 SPONDYLOLISTHESIS, LUMBAR REGION: ICD-10-CM

## 2024-07-29 DIAGNOSIS — M48.061 SPINAL STENOSIS, LUMBAR REGION WITHOUT NEUROGENIC CLAUDICATION: ICD-10-CM

## 2024-07-29 PROCEDURE — ZZZZZ: CPT

## 2024-08-01 NOTE — DATA REVIEWED
[FreeTextEntry1] : On my interpretations of these images from 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 small bb hnp L2/3 L3/4. facet arthritis L4/5 causing right lateral recess stenosis   I stop paperwork reviewed Ortho progress notes reviewed PT progress notes reviewed

## 2024-08-01 NOTE — HISTORY OF PRESENT ILLNESS
[0] : 0 [5] : 5 [Not working due to injury] : Work status: not working due to injury [de-identified] : 07/29/2024 - Patient presenting for mri review. Chief complaint of posterior right thigh pain. Back pain only produced when she is hunched over > 10 min. Pain worse sedentary, improves with physical activity. Treated with mdp which is described as overall helpful.   07/09/2024: 44 year F presenting today for an initial evaluation. Complaining of lower back pain radiating into the RT hip that has been present for a few years. 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 No weakness in the legs. Reports numbness and tingling radiating into RT leg and hip. Pain worsens with sitting and standing from a seated position. She has treated with NSAIDs and physical therapy. History of RT hip pain and saw Dr. Braga 2022. [de-identified] : none

## 2024-08-01 NOTE — DISCUSSION/SUMMARY
[de-identified] : 44 Y F with lumbar radiculopathy, MRI reveals mild anterolisthesis L4/5 with decreased disc signal. small bb hnp L2/3 L3/4. facet arthritis L4/5 causing right lateral recess stenosis  Patient was educated and informed on their condition along with the expected outcomes.   Plan - Start PT directed at the lumbar spine and lower extremity - I am referring the patient to pain management to discuss trying LESI for pain relief.  - Last resort she is a surgical candidate for decompressive surgery  - F/u 6 weeks  Cumulative encounter duration exceeded 30 minutes  Prior to appointment and during encounter with patient extensive medical records were reviewed including but not limited to, hospital records, out patient records, imaging results, and lab data. During this appointment the patient was examined, diagnoses were discussed and explained in a face to face manner. In addition extensive time was spent reviewing aforementioned diagnostic studies. Counseling including abnormal image results, differential diagnoses, treatment options, risk and benefits, lifestyle changes, current condition, and current medications was performed. Patient's comments, questions, and concerns were address and patient verbalized understanding. Based on this patient's presentation at our office, which is an orthopedic spine surgeon's office, this patient inherently / intrinsically has a risk, however minute, of developing issues such as Cauda equina syndrome, bowel and bladder changes, or progression of motor or neurological deficits such as paralysis which may be permanent.  KAYLEN FLOR Acting as a Scribe for Dr. Dalton FLORES, Kaylen Flor, attest that this documentation has been prepared under the direction and in the presence of Provider Chilango Hoffman MD.

## 2024-08-01 NOTE — HISTORY OF PRESENT ILLNESS
[0] : 0 [5] : 5 [Not working due to injury] : Work status: not working due to injury [de-identified] : 07/29/2024 - Patient presenting for mri review. Chief complaint of posterior right thigh pain. Back pain only produced when she is hunched over > 10 min. Pain worse sedentary, improves with physical activity. Treated with mdp which is described as overall helpful.   07/09/2024: 44 year F presenting today for an initial evaluation. Complaining of lower back pain radiating into the RT hip that has been present for a few years. 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 No weakness in the legs. Reports numbness and tingling radiating into RT leg and hip. Pain worsens with sitting and standing from a seated position. She has treated with NSAIDs and physical therapy. History of RT hip pain and saw Dr. Braga 2022. [de-identified] : none

## 2024-08-01 NOTE — DATA REVIEWED
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 small bb hnp L2/3 L3/4. facet arthritis L4/5 causing right lateral recess stenosis   I stop paperwork reviewed Ortho progress notes reviewed PT progress notes reviewed

## 2024-08-01 NOTE — PHYSICAL EXAM
[Normal Coordination] : normal coordination [Normal DTR UE/LE] : normal DTR UE/LE  [Normal Sensation] : normal sensation [Normal Mood and Affect] : normal mood and affect [Oriented] : oriented [Able to Communicate] : able to communicate [Normal Skin] : normal skin [No Rash] : no rash [No Ulcers] : no ulcers [No Lesions] : no lesions [No obvious lymphadenopathy in areas examined] : no obvious lymphadenopathy in areas examined [Well Developed] : well developed [Peripheral vascular exam is grossly normal] : peripheral vascular exam is grossly normal [No Respiratory Distress] : no respiratory distress [Extension] : extension [de-identified] : Constitutional:  - General Appearance:  Unremarkable Body Habitus  Well Developed  Well Nourished  Body Habitus  No Deformities  Well Groomed   Ability To communicate: Normal   Neurologic: Global sensation is intact to upper and lower extremities. See examination of Neck and/or Spine for exceptions.  Orientation to Time, Place and Person is: Normal Mood And Affect is Normal  Skin:  - Head/Face, Right Upper/Lower Extremity, Left Upper/Lower Extremity: Normal  See Examination of Neck and/or Spine for exceptions  Cardiovascular:  Peripheral Cardiovascular System is Normal   Palpation of Lymph Nodes: Normal Palpation of lymph nodes in: Axilla, Cervical, Inguinal  Abnormal Palpation of lymph nodes in: None [] : negative sitting straight leg raise

## 2024-08-22 ENCOUNTER — APPOINTMENT (OUTPATIENT)
Dept: PAIN MANAGEMENT | Facility: CLINIC | Age: 44
End: 2024-08-22

## 2024-08-22 VITALS — BODY MASS INDEX: 27.6 KG/M2 | HEIGHT: 62 IN | WEIGHT: 150 LBS

## 2024-08-22 DIAGNOSIS — M54.16 RADICULOPATHY, LUMBAR REGION: ICD-10-CM

## 2024-08-22 DIAGNOSIS — M43.16 SPONDYLOLISTHESIS, LUMBAR REGION: ICD-10-CM

## 2024-08-22 DIAGNOSIS — M70.61 TROCHANTERIC BURSITIS, RIGHT HIP: ICD-10-CM

## 2024-08-22 DIAGNOSIS — M48.061 SPINAL STENOSIS, LUMBAR REGION WITHOUT NEUROGENIC CLAUDICATION: ICD-10-CM

## 2024-08-22 DIAGNOSIS — Z09 ENCOUNTER FOR FOLLOW-UP EXAMINATION AFTER COMPLETED TREATMENT FOR CONDITIONS OTHER THAN MALIGNANT NEOPLASM: ICD-10-CM

## 2024-08-22 PROCEDURE — 99204 OFFICE O/P NEW MOD 45 MIN: CPT

## 2024-08-22 NOTE — HISTORY OF PRESENT ILLNESS
[Lower back] : lower back [6] : 6 [3] : 3 [Dull/Aching] : dull/aching [Intermittent] : intermittent [Sleep] : sleep [Nothing helps with pain getting better] : Nothing helps with pain getting better [Sitting] : sitting [Lying in bed] : lying in bed [FreeTextEntry1] : The patient presents for initial evaluation regarding their low back pain.  Patient was referred by Dr. Hoffman.  Patient reports chronic low back pain with a marked flare which occurred approximately 3 months ago.  She denies any particular inciting or exacerbating events.  Her current pain is in the low back with radiation to the right lateral thigh extending below the knee to the lateral calf.  Most of her pain is focal to the right lateral thigh.  There is associated numbness and tingling distal to the knee.  Patient was originally seen and treated for a diagnosis of right GTB/IT band syndrome last year.  She attended physical therapy without benefit.  She is currently attending physical therapy for approximately 2 weeks for her lumbar spine with some efficacy.  Using meloxicam as needed.   Subjective weakness: No Lower extremity paresthesias: Yes Bladder/bowel dysfunction: No   Injections: No   Pertinent Surgical History: N/A   Imaging: MRI Lumbar Spine (7/20/2024) -  Rad   Physician Disclaimer: I have personally reviewed and confirmed all HPI data with the patient. [] : Post Surgical Visit: no [FreeTextEntry6] : GRINDING  [FreeTextEntry7] : B/L LEGS  [FreeTextEntry8] : Waking up in the morning  [de-identified] : L MRI AT [facility]

## 2024-08-22 NOTE — PHYSICAL EXAM
[de-identified] : Constitutional: - No acute distress - Well developed; well nourished  Neurological: - normal mood and affect - alert and oriented x 3  Cardiovascular: - grossly normal  Lumbar Spine Exam:   Inspection: erythema (-) ecchymosis (-) rashes (-) alignment: no scoliosis   Palpation: Midline lumbar tenderness:            (-) midline thoracic tenderness:          (-) Lumbar paraspinal tenderness:      L (-); R (-) thoracic paraspinal tenderness:     L (-); R (-) sciatic nerve tenderness :              L (-); R (+) SI joint tenderness:                        L (-); R (-) GTB tenderness:                            L (-); R (+)   ROM:  WNL   Strength:                                    Right       Left   Hip Flexion:                (5/5)       (5/5) Quadriceps:               (5/5)       (5/5) Hamstrings:                (5/5)       (5/5) Ankle Dorsiflexion:     (5/5)       (5/5) EHL:                           (5/5)       (5/5) Ankle Plantarflexion:  (5/5)       (5/5)   Special Tests: SLR:                           R (+) ; L (-) Facet loading:            R (-) ; L (-) PAUL test:               R (-) ; L (-) Hamstring tightness:  R (+);  L (+)   Neurologic: SI LT throughout right lower extremity SI LT throughout left lower extremity   Reflexes normal and symmetric bilateral lower extremities   Gait: non- antalgic gait ambulates without assistive device [Right] : right hip [] : iliotibial band tenderness

## 2024-08-22 NOTE — PHYSICAL EXAM
[de-identified] : Constitutional: - No acute distress - Well developed; well nourished  Neurological: - normal mood and affect - alert and oriented x 3  Cardiovascular: - grossly normal  Lumbar Spine Exam:   Inspection: erythema (-) ecchymosis (-) rashes (-) alignment: no scoliosis   Palpation: Midline lumbar tenderness:            (-) midline thoracic tenderness:          (-) Lumbar paraspinal tenderness:      L (-); R (-) thoracic paraspinal tenderness:     L (-); R (-) sciatic nerve tenderness :              L (-); R (+) SI joint tenderness:                        L (-); R (-) GTB tenderness:                            L (-); R (+)   ROM:  WNL   Strength:                                    Right       Left   Hip Flexion:                (5/5)       (5/5) Quadriceps:               (5/5)       (5/5) Hamstrings:                (5/5)       (5/5) Ankle Dorsiflexion:     (5/5)       (5/5) EHL:                           (5/5)       (5/5) Ankle Plantarflexion:  (5/5)       (5/5)   Special Tests: SLR:                           R (+) ; L (-) Facet loading:            R (-) ; L (-) PAUL test:               R (-) ; L (-) Hamstring tightness:  R (+);  L (+)   Neurologic: SI LT throughout right lower extremity SI LT throughout left lower extremity   Reflexes normal and symmetric bilateral lower extremities   Gait: non- antalgic gait ambulates without assistive device [Right] : right hip [] : iliotibial band tenderness

## 2024-08-22 NOTE — ASSESSMENT
[FreeTextEntry1] : A discussion regarding available pain management treatment options occurred with the patient.  These included interventional, rehabilitative, pharmacological, and alternative modalities. We will proceed with the following:    Interventional treatment options: -Proceed with right L4-L5 TFESI with fluoroscopic guidance -Explained diagnostic and potentially therapeutic role for indicated procedure -If inadequate relief of focal lateral hip pain would consider right GTB injection; not discussed - see additional instructions below    Rehabilitative options: - continue physical therapy - participation in active HEP was discussed and encouraged as tolerated  Medication based treatment options: - Continue meloxicam 7.5-15 mg daily as needed - see additional instructions below    Complementary treatment options: - Weight management and lifestyle modifications discussed  Additional treatment recommendations as follows: - patient will follow-up with Dr. Hoffman as directed - Follow up 1-2 weeks post injection for assessment of efficacy and further treatment recommendations  We have discussed the risks, benefits, and alternatives for NSAID therapy including but not limited to the risk of bleeding, thrombosis, gastric mucosal irritation/ulceration, allergic reaction and kidney dysfunction.  The patient verbalizes an understanding.  The risks, benefits and alternatives of the proposed procedure were explained in detail with the patient. The risks outlined include but are not limited to infection, bleeding, post- dural puncture headache, nerve injury, a temporary increase in pain, failure to resolve symptoms, need for future interventions, allergic reaction, and possible elevation of blood sugar in diabetics if using corticosteroid.  All questions were answered to patient's apparent satisfaction, and he/she verbalized an understanding.

## 2024-08-22 NOTE — HISTORY OF PRESENT ILLNESS
[Lower back] : lower back [6] : 6 [3] : 3 [Dull/Aching] : dull/aching [Intermittent] : intermittent [Sleep] : sleep [Nothing helps with pain getting better] : Nothing helps with pain getting better [Sitting] : sitting [Lying in bed] : lying in bed [FreeTextEntry1] : The patient presents for initial evaluation regarding their low back pain.  Patient was referred by Dr. Hoffman.  Patient reports chronic low back pain with a marked flare which occurred approximately 3 months ago.  She denies any particular inciting or exacerbating events.  Her current pain is in the low back with radiation to the right lateral thigh extending below the knee to the lateral calf.  Most of her pain is focal to the right lateral thigh.  There is associated numbness and tingling distal to the knee.  Patient was originally seen and treated for a diagnosis of right GTB/IT band syndrome last year.  She attended physical therapy without benefit.  She is currently attending physical therapy for approximately 2 weeks for her lumbar spine with some efficacy.  Using meloxicam as needed.   Subjective weakness: No Lower extremity paresthesias: Yes Bladder/bowel dysfunction: No   Injections: No   Pertinent Surgical History: N/A   Imaging: MRI Lumbar Spine (7/20/2024) -  Rad   Physician Disclaimer: I have personally reviewed and confirmed all HPI data with the patient. [] : Post Surgical Visit: no [FreeTextEntry6] : GRINDING  [FreeTextEntry7] : B/L LEGS  [FreeTextEntry8] : Waking up in the morning  [de-identified] : L MRI AT [facility]

## 2024-09-16 ENCOUNTER — APPOINTMENT (OUTPATIENT)
Dept: ORTHOPEDIC SURGERY | Facility: CLINIC | Age: 44
End: 2024-09-16

## 2024-09-24 ENCOUNTER — APPOINTMENT (OUTPATIENT)
Dept: COLORECTAL SURGERY | Facility: CLINIC | Age: 44
End: 2024-09-24
Payer: COMMERCIAL

## 2024-09-24 VITALS — BODY MASS INDEX: 27.6 KG/M2 | WEIGHT: 150 LBS | HEIGHT: 62 IN | RESPIRATION RATE: 14 BRPM

## 2024-09-24 DIAGNOSIS — R19.8 OTHER SPECIFIED SYMPTOMS AND SIGNS INVOLVING THE DIGESTIVE SYSTEM AND ABDOMEN: ICD-10-CM

## 2024-09-24 DIAGNOSIS — R19.4 CHANGE IN BOWEL HABIT: ICD-10-CM

## 2024-09-24 PROCEDURE — 99204 OFFICE O/P NEW MOD 45 MIN: CPT

## 2024-09-24 NOTE — HISTORY OF PRESENT ILLNESS
[FreeTextEntry1] : 44-year-old female with past history of chronic anal fissure now with recent changes in bowel movements symptoms been present for several months and worsening increasing abdominal pain nausea vomiting and diarrhea.  He is scheduled to have an EGD with Dr. Post

## 2024-09-24 NOTE — ASSESSMENT
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  Recommend colonoscopy. Risks and benefits of colonoscopy have been discussed which include but not limited to bleeding, perforation, missing a cancer or polyp occurring 5%.  Recommend high fiber diet, Metamucil daily, sitz baths, stool softeners, pain medications p.r.n.
[phone]

## 2024-09-24 NOTE — PHYSICAL EXAM
[Abdomen Masses] : No abdominal masses [Abdomen Tenderness] : ~T No ~M abdominal tenderness [Tender] : nontender [Normal rectal exam] : exam was normal [Posterior] : posteriorly [Excoriation] : no perianal excoriation [Tender, Swollen] : tender, swollen [JVD] : no jugular venous distention  [Normal Breath Sounds] : Normal breath sounds [Normal Heart Sounds] : normal heart sounds [No Rash or Lesion] : No rash or lesion [Alert] : alert [Oriented to Person] : oriented to person [Oriented to Place] : oriented to place [Oriented to Time] : oriented to time [Calm] : calm [de-identified] : Chronic posterior anal fissure is stable [de-identified] : Looks well in no distress, of stated age.

## 2025-01-02 ENCOUNTER — NON-APPOINTMENT (OUTPATIENT)
Age: 45
End: 2025-01-02